# Patient Record
Sex: MALE | Race: WHITE | Employment: FULL TIME | URBAN - METROPOLITAN AREA
[De-identification: names, ages, dates, MRNs, and addresses within clinical notes are randomized per-mention and may not be internally consistent; named-entity substitution may affect disease eponyms.]

---

## 2018-04-27 ENCOUNTER — APPOINTMENT (OUTPATIENT)
Dept: GENERAL RADIOLOGY | Age: 58
End: 2018-04-27
Payer: COMMERCIAL

## 2018-04-27 ENCOUNTER — HOSPITAL ENCOUNTER (OUTPATIENT)
Age: 58
Setting detail: OBSERVATION
Discharge: HOME | End: 2018-04-28
Attending: EMERGENCY MEDICINE | Admitting: INTERNAL MEDICINE
Payer: COMMERCIAL

## 2018-04-27 DIAGNOSIS — R07.9 CHEST PAIN, UNSPECIFIED TYPE: Primary | ICD-10-CM

## 2018-04-27 LAB
ALBUMIN SERPL-MCNC: 4.3 G/DL (ref 3.5–5.2)
ALP BLD-CCNC: 70 U/L (ref 40–129)
ALT SERPL-CCNC: 19 U/L (ref 0–40)
ANION GAP SERPL CALCULATED.3IONS-SCNC: 11 MMOL/L (ref 7–16)
AST SERPL-CCNC: 18 U/L (ref 0–39)
BASOPHILS ABSOLUTE: 0.05 E9/L (ref 0–0.2)
BASOPHILS RELATIVE PERCENT: 0.8 % (ref 0–2)
BILIRUB SERPL-MCNC: 0.4 MG/DL (ref 0–1.2)
BUN BLDV-MCNC: 16 MG/DL (ref 6–20)
CALCIUM SERPL-MCNC: 9.5 MG/DL (ref 8.6–10.2)
CHLORIDE BLD-SCNC: 99 MMOL/L (ref 98–107)
CK MB: 2.8 NG/ML (ref 0–7.7)
CO2: 29 MMOL/L (ref 22–29)
CREAT SERPL-MCNC: 0.9 MG/DL (ref 0.7–1.2)
D DIMER: 217 NG/ML DDU
EOSINOPHILS ABSOLUTE: 0.12 E9/L (ref 0.05–0.5)
EOSINOPHILS RELATIVE PERCENT: 2 % (ref 0–6)
FOLATE: >20 NG/ML (ref 4.8–24.2)
GFR AFRICAN AMERICAN: >60
GFR NON-AFRICAN AMERICAN: >60 ML/MIN/1.73
GLUCOSE BLD-MCNC: 101 MG/DL (ref 74–109)
HBA1C MFR BLD: 5.6 % (ref 4.8–5.9)
HCT VFR BLD CALC: 38.9 % (ref 37–54)
HEMOGLOBIN: 13.1 G/DL (ref 12.5–16.5)
IMMATURE GRANULOCYTES #: 0.02 E9/L
IMMATURE GRANULOCYTES %: 0.3 % (ref 0–5)
LV EF: 55 %
LVEF MODALITY: NORMAL
LYMPHOCYTES ABSOLUTE: 0.79 E9/L (ref 1.5–4)
LYMPHOCYTES RELATIVE PERCENT: 13.4 % (ref 20–42)
MCH RBC QN AUTO: 32.3 PG (ref 26–35)
MCHC RBC AUTO-ENTMCNC: 33.7 % (ref 32–34.5)
MCV RBC AUTO: 96 FL (ref 80–99.9)
MONOCYTES ABSOLUTE: 0.56 E9/L (ref 0.1–0.95)
MONOCYTES RELATIVE PERCENT: 9.5 % (ref 2–12)
NEUTROPHILS ABSOLUTE: 4.36 E9/L (ref 1.8–7.3)
NEUTROPHILS RELATIVE PERCENT: 74 % (ref 43–80)
PDW BLD-RTO: 11.8 FL (ref 11.5–15)
PHOSPHORUS: 3.2 MG/DL (ref 2.5–4.5)
PLATELET # BLD: 380 E9/L (ref 130–450)
PMV BLD AUTO: 9.4 FL (ref 7–12)
POTASSIUM SERPL-SCNC: 4.1 MMOL/L (ref 3.5–5)
PRO-BNP: 31 PG/ML (ref 0–125)
RBC # BLD: 4.05 E12/L (ref 3.8–5.8)
SODIUM BLD-SCNC: 139 MMOL/L (ref 132–146)
T4 FREE: 1 NG/DL (ref 0.93–1.7)
TOTAL PROTEIN: 7.4 G/DL (ref 6.4–8.3)
TROPONIN: <0.01 NG/ML (ref 0–0.03)
VITAMIN B-12: 420 PG/ML (ref 211–946)
WBC # BLD: 5.9 E9/L (ref 4.5–11.5)

## 2018-04-27 PROCEDURE — 71045 X-RAY EXAM CHEST 1 VIEW: CPT

## 2018-04-27 PROCEDURE — 84439 ASSAY OF FREE THYROXINE: CPT

## 2018-04-27 PROCEDURE — 84484 ASSAY OF TROPONIN QUANT: CPT

## 2018-04-27 PROCEDURE — G0378 HOSPITAL OBSERVATION PER HR: HCPCS

## 2018-04-27 PROCEDURE — 85378 FIBRIN DEGRADE SEMIQUANT: CPT

## 2018-04-27 PROCEDURE — 82746 ASSAY OF FOLIC ACID SERUM: CPT

## 2018-04-27 PROCEDURE — 82607 VITAMIN B-12: CPT

## 2018-04-27 PROCEDURE — 83880 ASSAY OF NATRIURETIC PEPTIDE: CPT

## 2018-04-27 PROCEDURE — 87088 URINE BACTERIA CULTURE: CPT

## 2018-04-27 PROCEDURE — 85025 COMPLETE CBC W/AUTO DIFF WBC: CPT

## 2018-04-27 PROCEDURE — 93306 TTE W/DOPPLER COMPLETE: CPT

## 2018-04-27 PROCEDURE — 96372 THER/PROPH/DIAG INJ SC/IM: CPT

## 2018-04-27 PROCEDURE — 36415 COLL VENOUS BLD VENIPUNCTURE: CPT

## 2018-04-27 PROCEDURE — 80053 COMPREHEN METABOLIC PANEL: CPT

## 2018-04-27 PROCEDURE — 99284 EMERGENCY DEPT VISIT MOD MDM: CPT

## 2018-04-27 PROCEDURE — 6370000000 HC RX 637 (ALT 250 FOR IP): Performed by: STUDENT IN AN ORGANIZED HEALTH CARE EDUCATION/TRAINING PROGRAM

## 2018-04-27 PROCEDURE — 6370000000 HC RX 637 (ALT 250 FOR IP): Performed by: INTERNAL MEDICINE

## 2018-04-27 PROCEDURE — 84100 ASSAY OF PHOSPHORUS: CPT

## 2018-04-27 PROCEDURE — 2580000003 HC RX 258: Performed by: INTERNAL MEDICINE

## 2018-04-27 PROCEDURE — 6360000002 HC RX W HCPCS: Performed by: INTERNAL MEDICINE

## 2018-04-27 PROCEDURE — 93005 ELECTROCARDIOGRAM TRACING: CPT | Performed by: STUDENT IN AN ORGANIZED HEALTH CARE EDUCATION/TRAINING PROGRAM

## 2018-04-27 PROCEDURE — 82553 CREATINE MB FRACTION: CPT

## 2018-04-27 PROCEDURE — 83036 HEMOGLOBIN GLYCOSYLATED A1C: CPT

## 2018-04-27 RX ORDER — TAMSULOSIN HYDROCHLORIDE 0.4 MG/1
0.4 CAPSULE ORAL DAILY
Status: DISCONTINUED | OUTPATIENT
Start: 2018-04-27 | End: 2018-04-27

## 2018-04-27 RX ORDER — SODIUM CHLORIDE 9 MG/ML
INJECTION, SOLUTION INTRAVENOUS CONTINUOUS
Status: DISCONTINUED | OUTPATIENT
Start: 2018-04-27 | End: 2018-04-28 | Stop reason: HOSPADM

## 2018-04-27 RX ORDER — CITALOPRAM 20 MG/1
15 TABLET ORAL DAILY
Status: DISCONTINUED | OUTPATIENT
Start: 2018-04-27 | End: 2018-04-28 | Stop reason: HOSPADM

## 2018-04-27 RX ORDER — NAPROXEN 500 MG/1
500 TABLET ORAL 2 TIMES DAILY
Status: DISCONTINUED | OUTPATIENT
Start: 2018-04-27 | End: 2018-04-27

## 2018-04-27 RX ORDER — PANTOPRAZOLE SODIUM 40 MG/1
40 TABLET, DELAYED RELEASE ORAL
Status: DISCONTINUED | OUTPATIENT
Start: 2018-04-28 | End: 2018-04-28 | Stop reason: HOSPADM

## 2018-04-27 RX ORDER — ASPIRIN 81 MG/1
324 TABLET, CHEWABLE ORAL ONCE
Status: COMPLETED | OUTPATIENT
Start: 2018-04-27 | End: 2018-04-27

## 2018-04-27 RX ORDER — NITROGLYCERIN 0.4 MG/1
0.4 TABLET SUBLINGUAL EVERY 5 MIN PRN
Status: DISCONTINUED | OUTPATIENT
Start: 2018-04-27 | End: 2018-04-28 | Stop reason: HOSPADM

## 2018-04-27 RX ORDER — HYDROCODONE BITARTRATE AND ACETAMINOPHEN 10; 325 MG/1; MG/1
1 TABLET ORAL NIGHTLY
Status: DISCONTINUED | OUTPATIENT
Start: 2018-04-27 | End: 2018-04-28 | Stop reason: HOSPADM

## 2018-04-27 RX ORDER — METOPROLOL SUCCINATE 50 MG/1
50 TABLET, EXTENDED RELEASE ORAL DAILY
Status: DISCONTINUED | OUTPATIENT
Start: 2018-04-27 | End: 2018-04-28 | Stop reason: HOSPADM

## 2018-04-27 RX ORDER — ONDANSETRON 4 MG/1
4 TABLET, FILM COATED ORAL EVERY 8 HOURS PRN
Status: DISCONTINUED | OUTPATIENT
Start: 2018-04-27 | End: 2018-04-28 | Stop reason: HOSPADM

## 2018-04-27 RX ORDER — ASPIRIN 81 MG/1
81 TABLET, CHEWABLE ORAL DAILY
Status: DISCONTINUED | OUTPATIENT
Start: 2018-04-27 | End: 2018-04-28 | Stop reason: HOSPADM

## 2018-04-27 RX ORDER — HYDROCODONE BITARTRATE AND ACETAMINOPHEN 10; 325 MG/1; MG/1
1 TABLET ORAL NIGHTLY
COMMUNITY

## 2018-04-27 RX ORDER — ACETAMINOPHEN 325 MG/1
650 TABLET ORAL EVERY 4 HOURS PRN
Status: DISCONTINUED | OUTPATIENT
Start: 2018-04-27 | End: 2018-04-28 | Stop reason: HOSPADM

## 2018-04-27 RX ORDER — ATORVASTATIN CALCIUM 40 MG/1
40 TABLET, FILM COATED ORAL NIGHTLY
Status: DISCONTINUED | OUTPATIENT
Start: 2018-04-27 | End: 2018-04-28 | Stop reason: HOSPADM

## 2018-04-27 RX ADMIN — ASPIRIN 81 MG 324 MG: 81 TABLET ORAL at 09:07

## 2018-04-27 RX ADMIN — ACETAMINOPHEN 650 MG: 325 TABLET, FILM COATED ORAL at 16:47

## 2018-04-27 RX ADMIN — ENOXAPARIN SODIUM 40 MG: 40 INJECTION SUBCUTANEOUS at 12:58

## 2018-04-27 RX ADMIN — HYDROCODONE BITARTRATE AND ACETAMINOPHEN 1 TABLET: 10; 325 TABLET ORAL at 20:43

## 2018-04-27 RX ADMIN — CITALOPRAM 15 MG: 20 TABLET, FILM COATED ORAL at 14:25

## 2018-04-27 RX ADMIN — SODIUM CHLORIDE: 9 INJECTION, SOLUTION INTRAVENOUS at 11:58

## 2018-04-27 RX ADMIN — METOPROLOL SUCCINATE 50 MG: 50 TABLET, FILM COATED, EXTENDED RELEASE ORAL at 12:58

## 2018-04-27 ASSESSMENT — ENCOUNTER SYMPTOMS
WHEEZING: 0
SHORTNESS OF BREATH: 1
CHEST TIGHTNESS: 0
APNEA: 0
CHOKING: 0
COLOR CHANGE: 0
ABDOMINAL PAIN: 0
STRIDOR: 0
CONSTIPATION: 0
VOMITING: 0
BACK PAIN: 0
COUGH: 0
SORE THROAT: 0
DIARRHEA: 0
NAUSEA: 1

## 2018-04-27 ASSESSMENT — PAIN SCALES - GENERAL
PAINLEVEL_OUTOF10: 4
PAINLEVEL_OUTOF10: 0
PAINLEVEL_OUTOF10: 4
PAINLEVEL_OUTOF10: 5
PAINLEVEL_OUTOF10: 4

## 2018-04-27 ASSESSMENT — HEART SCORE: ECG: 0

## 2018-04-27 ASSESSMENT — PAIN DESCRIPTION - FREQUENCY: FREQUENCY: CONTINUOUS

## 2018-04-27 ASSESSMENT — PAIN DESCRIPTION - LOCATION
LOCATION: HEAD

## 2018-04-27 ASSESSMENT — PAIN DESCRIPTION - DESCRIPTORS: DESCRIPTORS: HEADACHE

## 2018-04-27 ASSESSMENT — PAIN DESCRIPTION - PAIN TYPE: TYPE: ACUTE PAIN

## 2018-04-28 ENCOUNTER — APPOINTMENT (OUTPATIENT)
Dept: NUCLEAR MEDICINE | Age: 58
End: 2018-04-28
Payer: COMMERCIAL

## 2018-04-28 VITALS
RESPIRATION RATE: 16 BRPM | TEMPERATURE: 97.7 F | SYSTOLIC BLOOD PRESSURE: 124 MMHG | WEIGHT: 167.2 LBS | DIASTOLIC BLOOD PRESSURE: 72 MMHG | HEIGHT: 69 IN | BODY MASS INDEX: 24.76 KG/M2 | OXYGEN SATURATION: 98 % | HEART RATE: 80 BPM

## 2018-04-28 PROBLEM — E78.5 LIPIDS SERUM INCREASED: Status: ACTIVE | Noted: 2018-04-28

## 2018-04-28 LAB
ALBUMIN SERPL-MCNC: 3.6 G/DL (ref 3.5–5.2)
ALP BLD-CCNC: 63 U/L (ref 40–129)
ALT SERPL-CCNC: 16 U/L (ref 0–40)
ANION GAP SERPL CALCULATED.3IONS-SCNC: 14 MMOL/L (ref 7–16)
AST SERPL-CCNC: 15 U/L (ref 0–39)
BASOPHILS ABSOLUTE: 0.05 E9/L (ref 0–0.2)
BASOPHILS RELATIVE PERCENT: 0.8 % (ref 0–2)
BILIRUB SERPL-MCNC: 0.4 MG/DL (ref 0–1.2)
BUN BLDV-MCNC: 14 MG/DL (ref 6–20)
CALCIUM SERPL-MCNC: 8.9 MG/DL (ref 8.6–10.2)
CHLORIDE BLD-SCNC: 101 MMOL/L (ref 98–107)
CHOLESTEROL, TOTAL: 230 MG/DL (ref 0–199)
CO2: 26 MMOL/L (ref 22–29)
CREAT SERPL-MCNC: 0.9 MG/DL (ref 0.7–1.2)
EOSINOPHILS ABSOLUTE: 0.23 E9/L (ref 0.05–0.5)
EOSINOPHILS RELATIVE PERCENT: 3.6 % (ref 0–6)
GFR AFRICAN AMERICAN: >60
GFR NON-AFRICAN AMERICAN: >60 ML/MIN/1.73
GLUCOSE BLD-MCNC: 89 MG/DL (ref 74–109)
HCT VFR BLD CALC: 39.1 % (ref 37–54)
HDLC SERPL-MCNC: 38 MG/DL
HEMOGLOBIN: 13.2 G/DL (ref 12.5–16.5)
IMMATURE GRANULOCYTES #: 0.03 E9/L
IMMATURE GRANULOCYTES %: 0.5 % (ref 0–5)
LDL CHOLESTEROL CALCULATED: 163 MG/DL (ref 0–99)
LV EF: 55 %
LVEF MODALITY: NORMAL
LYMPHOCYTES ABSOLUTE: 1.18 E9/L (ref 1.5–4)
LYMPHOCYTES RELATIVE PERCENT: 18.5 % (ref 20–42)
MAGNESIUM: 2.2 MG/DL (ref 1.6–2.6)
MCH RBC QN AUTO: 32.9 PG (ref 26–35)
MCHC RBC AUTO-ENTMCNC: 33.8 % (ref 32–34.5)
MCV RBC AUTO: 97.5 FL (ref 80–99.9)
MONOCYTES ABSOLUTE: 0.68 E9/L (ref 0.1–0.95)
MONOCYTES RELATIVE PERCENT: 10.7 % (ref 2–12)
NEUTROPHILS ABSOLUTE: 4.21 E9/L (ref 1.8–7.3)
NEUTROPHILS RELATIVE PERCENT: 65.9 % (ref 43–80)
PDW BLD-RTO: 12 FL (ref 11.5–15)
PLATELET # BLD: 379 E9/L (ref 130–450)
PMV BLD AUTO: 9.7 FL (ref 7–12)
POTASSIUM SERPL-SCNC: 4 MMOL/L (ref 3.5–5)
RBC # BLD: 4.01 E12/L (ref 3.8–5.8)
SODIUM BLD-SCNC: 141 MMOL/L (ref 132–146)
TOTAL PROTEIN: 6.9 G/DL (ref 6.4–8.3)
TRIGL SERPL-MCNC: 144 MG/DL (ref 0–149)
TSH SERPL DL<=0.05 MIU/L-ACNC: 1.56 UIU/ML (ref 0.27–4.2)
VLDLC SERPL CALC-MCNC: 29 MG/DL
WBC # BLD: 6.4 E9/L (ref 4.5–11.5)

## 2018-04-28 PROCEDURE — 84443 ASSAY THYROID STIM HORMONE: CPT

## 2018-04-28 PROCEDURE — 93017 CV STRESS TEST TRACING ONLY: CPT

## 2018-04-28 PROCEDURE — A9500 TC99M SESTAMIBI: HCPCS | Performed by: RADIOLOGY

## 2018-04-28 PROCEDURE — 78452 HT MUSCLE IMAGE SPECT MULT: CPT

## 2018-04-28 PROCEDURE — G0378 HOSPITAL OBSERVATION PER HR: HCPCS

## 2018-04-28 PROCEDURE — 85025 COMPLETE CBC W/AUTO DIFF WBC: CPT

## 2018-04-28 PROCEDURE — 6370000000 HC RX 637 (ALT 250 FOR IP): Performed by: INTERNAL MEDICINE

## 2018-04-28 PROCEDURE — 36415 COLL VENOUS BLD VENIPUNCTURE: CPT

## 2018-04-28 PROCEDURE — 80061 LIPID PANEL: CPT

## 2018-04-28 PROCEDURE — 80053 COMPREHEN METABOLIC PANEL: CPT

## 2018-04-28 PROCEDURE — 3430000000 HC RX DIAGNOSTIC RADIOPHARMACEUTICAL: Performed by: RADIOLOGY

## 2018-04-28 PROCEDURE — 83735 ASSAY OF MAGNESIUM: CPT

## 2018-04-28 RX ADMIN — PANTOPRAZOLE SODIUM 40 MG: 40 TABLET, DELAYED RELEASE ORAL at 06:25

## 2018-04-28 RX ADMIN — ASPIRIN 81 MG 81 MG: 81 TABLET ORAL at 08:50

## 2018-04-28 RX ADMIN — Medication 35 MILLICURIE: at 11:55

## 2018-04-28 RX ADMIN — Medication 11 MILLICURIE: at 09:10

## 2018-04-28 RX ADMIN — METOPROLOL SUCCINATE 50 MG: 50 TABLET, FILM COATED, EXTENDED RELEASE ORAL at 08:51

## 2018-04-28 RX ADMIN — CITALOPRAM 15 MG: 20 TABLET, FILM COATED ORAL at 08:50

## 2018-04-28 ASSESSMENT — PAIN SCALES - GENERAL
PAINLEVEL_OUTOF10: 0
PAINLEVEL_OUTOF10: 0

## 2018-04-29 LAB — URINE CULTURE, ROUTINE: NORMAL

## 2018-04-30 LAB
EKG ATRIAL RATE: 77 BPM
EKG P AXIS: 68 DEGREES
EKG P-R INTERVAL: 170 MS
EKG Q-T INTERVAL: 400 MS
EKG QRS DURATION: 80 MS
EKG QTC CALCULATION (BAZETT): 452 MS
EKG R AXIS: 48 DEGREES
EKG T AXIS: 39 DEGREES
EKG VENTRICULAR RATE: 77 BPM

## 2018-05-31 ENCOUNTER — HOSPITAL ENCOUNTER (OUTPATIENT)
Age: 58
Discharge: HOME | End: 2018-06-02
Payer: COMMERCIAL

## 2018-05-31 LAB
ALBUMIN SERPL-MCNC: 4.1 G/DL (ref 3.5–5.2)
ALP BLD-CCNC: 66 U/L (ref 40–129)
ALT SERPL-CCNC: 21 U/L (ref 0–40)
ANION GAP SERPL CALCULATED.3IONS-SCNC: 16 MMOL/L (ref 7–16)
AST SERPL-CCNC: 20 U/L (ref 0–39)
BASOPHILS ABSOLUTE: 0.06 E9/L (ref 0–0.2)
BASOPHILS RELATIVE PERCENT: 1 % (ref 0–2)
BILIRUB SERPL-MCNC: <0.2 MG/DL (ref 0–1.2)
BUN BLDV-MCNC: 14 MG/DL (ref 6–20)
CALCIUM SERPL-MCNC: 9.2 MG/DL (ref 8.6–10.2)
CHLORIDE BLD-SCNC: 104 MMOL/L (ref 98–107)
CHOLESTEROL, TOTAL: 207 MG/DL (ref 0–199)
CO2: 24 MMOL/L (ref 22–29)
CREAT SERPL-MCNC: 0.9 MG/DL (ref 0.7–1.2)
EOSINOPHILS ABSOLUTE: 0.13 E9/L (ref 0.05–0.5)
EOSINOPHILS RELATIVE PERCENT: 2.2 % (ref 0–6)
GFR AFRICAN AMERICAN: >60
GFR NON-AFRICAN AMERICAN: >60 ML/MIN/1.73
GLUCOSE BLD-MCNC: 85 MG/DL (ref 74–109)
HCT VFR BLD CALC: 39.7 % (ref 37–54)
HDLC SERPL-MCNC: 42 MG/DL
HEMOGLOBIN: 13 G/DL (ref 12.5–16.5)
IMMATURE GRANULOCYTES #: 0.02 E9/L
IMMATURE GRANULOCYTES %: 0.3 % (ref 0–5)
LDL CHOLESTEROL CALCULATED: 136 MG/DL (ref 0–99)
LYMPHOCYTES ABSOLUTE: 0.9 E9/L (ref 1.5–4)
LYMPHOCYTES RELATIVE PERCENT: 15.3 % (ref 20–42)
MCH RBC QN AUTO: 32.9 PG (ref 26–35)
MCHC RBC AUTO-ENTMCNC: 32.7 % (ref 32–34.5)
MCV RBC AUTO: 100.5 FL (ref 80–99.9)
MONOCYTES ABSOLUTE: 0.59 E9/L (ref 0.1–0.95)
MONOCYTES RELATIVE PERCENT: 10 % (ref 2–12)
NEUTROPHILS ABSOLUTE: 4.19 E9/L (ref 1.8–7.3)
NEUTROPHILS RELATIVE PERCENT: 71.2 % (ref 43–80)
PDW BLD-RTO: 12.1 FL (ref 11.5–15)
PLATELET # BLD: 377 E9/L (ref 130–450)
PMV BLD AUTO: 10 FL (ref 7–12)
POTASSIUM SERPL-SCNC: 4.4 MMOL/L (ref 3.5–5)
PROSTATE SPECIFIC ANTIGEN: 1.7 NG/ML (ref 0–4)
RBC # BLD: 3.95 E12/L (ref 3.8–5.8)
SODIUM BLD-SCNC: 144 MMOL/L (ref 132–146)
TOTAL PROTEIN: 7.2 G/DL (ref 6.4–8.3)
TRIGL SERPL-MCNC: 144 MG/DL (ref 0–149)
TSH SERPL DL<=0.05 MIU/L-ACNC: 1.32 UIU/ML (ref 0.27–4.2)
VITAMIN D 25-HYDROXY: 44 NG/ML (ref 30–100)
VLDLC SERPL CALC-MCNC: 29 MG/DL
WBC # BLD: 5.9 E9/L (ref 4.5–11.5)

## 2018-05-31 PROCEDURE — 80061 LIPID PANEL: CPT

## 2018-05-31 PROCEDURE — 82306 VITAMIN D 25 HYDROXY: CPT

## 2018-05-31 PROCEDURE — 85025 COMPLETE CBC W/AUTO DIFF WBC: CPT

## 2018-05-31 PROCEDURE — 84443 ASSAY THYROID STIM HORMONE: CPT

## 2018-05-31 PROCEDURE — 80053 COMPREHEN METABOLIC PANEL: CPT

## 2018-05-31 PROCEDURE — G0103 PSA SCREENING: HCPCS

## 2018-09-05 ENCOUNTER — HOSPITAL ENCOUNTER (OUTPATIENT)
Age: 58
Discharge: HOME | End: 2018-09-07
Payer: COMMERCIAL

## 2018-09-05 LAB
ALBUMIN SERPL-MCNC: 4.1 G/DL (ref 3.5–5.2)
ALP BLD-CCNC: 66 U/L (ref 40–129)
ALT SERPL-CCNC: 29 U/L (ref 0–40)
AST SERPL-CCNC: 28 U/L (ref 0–39)
BILIRUB SERPL-MCNC: 0.4 MG/DL (ref 0–1.2)
BILIRUBIN DIRECT: <0.2 MG/DL (ref 0–0.3)
BILIRUBIN, INDIRECT: NORMAL MG/DL (ref 0–1)
CHOLESTEROL, TOTAL: 194 MG/DL (ref 0–199)
HDLC SERPL-MCNC: 47 MG/DL
LDL CHOLESTEROL CALCULATED: 124 MG/DL (ref 0–99)
TOTAL PROTEIN: 7.3 G/DL (ref 6.4–8.3)
TRIGL SERPL-MCNC: 114 MG/DL (ref 0–149)
VLDLC SERPL CALC-MCNC: 23 MG/DL

## 2018-09-05 PROCEDURE — 80061 LIPID PANEL: CPT

## 2018-09-05 PROCEDURE — 80076 HEPATIC FUNCTION PANEL: CPT

## 2019-03-20 ENCOUNTER — HOSPITAL ENCOUNTER (OUTPATIENT)
Age: 59
Discharge: HOME | End: 2019-03-22
Payer: COMMERCIAL

## 2019-03-20 LAB
ALBUMIN SERPL-MCNC: 4.1 G/DL (ref 3.5–5.2)
ALP BLD-CCNC: 66 U/L (ref 40–129)
ALT SERPL-CCNC: 24 U/L (ref 0–40)
ANION GAP SERPL CALCULATED.3IONS-SCNC: 11 MMOL/L (ref 7–16)
AST SERPL-CCNC: 19 U/L (ref 0–39)
BASOPHILS ABSOLUTE: 0.03 E9/L (ref 0–0.2)
BASOPHILS RELATIVE PERCENT: 0.3 % (ref 0–2)
BILIRUB SERPL-MCNC: <0.2 MG/DL (ref 0–1.2)
BUN BLDV-MCNC: 15 MG/DL (ref 6–20)
CALCIUM SERPL-MCNC: 8.9 MG/DL (ref 8.6–10.2)
CHLORIDE BLD-SCNC: 108 MMOL/L (ref 98–107)
CHOLESTEROL, TOTAL: 137 MG/DL (ref 0–199)
CO2: 28 MMOL/L (ref 22–29)
CREAT SERPL-MCNC: 0.9 MG/DL (ref 0.7–1.2)
EOSINOPHILS ABSOLUTE: 0.03 E9/L (ref 0.05–0.5)
EOSINOPHILS RELATIVE PERCENT: 0.3 % (ref 0–6)
GFR AFRICAN AMERICAN: >60
GFR NON-AFRICAN AMERICAN: >60 ML/MIN/1.73
GLUCOSE BLD-MCNC: 97 MG/DL (ref 74–99)
HCT VFR BLD CALC: 39.3 % (ref 37–54)
HDLC SERPL-MCNC: 47 MG/DL
HEMOGLOBIN: 12.5 G/DL (ref 12.5–16.5)
IMMATURE GRANULOCYTES #: 0.07 E9/L
IMMATURE GRANULOCYTES %: 0.7 % (ref 0–5)
LDL CHOLESTEROL CALCULATED: 79 MG/DL (ref 0–99)
LYMPHOCYTES ABSOLUTE: 1.65 E9/L (ref 1.5–4)
LYMPHOCYTES RELATIVE PERCENT: 16.8 % (ref 20–42)
MCH RBC QN AUTO: 32.6 PG (ref 26–35)
MCHC RBC AUTO-ENTMCNC: 31.8 % (ref 32–34.5)
MCV RBC AUTO: 102.3 FL (ref 80–99.9)
MONOCYTES ABSOLUTE: 1.1 E9/L (ref 0.1–0.95)
MONOCYTES RELATIVE PERCENT: 11.2 % (ref 2–12)
NEUTROPHILS ABSOLUTE: 6.93 E9/L (ref 1.8–7.3)
NEUTROPHILS RELATIVE PERCENT: 70.7 % (ref 43–80)
PDW BLD-RTO: 11.9 FL (ref 11.5–15)
PLATELET # BLD: 393 E9/L (ref 130–450)
PMV BLD AUTO: 10.3 FL (ref 7–12)
POTASSIUM SERPL-SCNC: 4.1 MMOL/L (ref 3.5–5)
PROSTATE SPECIFIC ANTIGEN: 1.18 NG/ML (ref 0–4)
RBC # BLD: 3.84 E12/L (ref 3.8–5.8)
SODIUM BLD-SCNC: 147 MMOL/L (ref 132–146)
TOTAL PROTEIN: 6.7 G/DL (ref 6.4–8.3)
TRIGL SERPL-MCNC: 57 MG/DL (ref 0–149)
TSH SERPL DL<=0.05 MIU/L-ACNC: 0.92 UIU/ML (ref 0.27–4.2)
VITAMIN D 25-HYDROXY: 47 NG/ML (ref 30–100)
VLDLC SERPL CALC-MCNC: 11 MG/DL
WBC # BLD: 9.8 E9/L (ref 4.5–11.5)

## 2019-03-20 PROCEDURE — 85025 COMPLETE CBC W/AUTO DIFF WBC: CPT

## 2019-03-20 PROCEDURE — G0103 PSA SCREENING: HCPCS

## 2019-03-20 PROCEDURE — 82306 VITAMIN D 25 HYDROXY: CPT

## 2019-03-20 PROCEDURE — 84443 ASSAY THYROID STIM HORMONE: CPT

## 2019-03-20 PROCEDURE — 80061 LIPID PANEL: CPT

## 2019-03-20 PROCEDURE — 80053 COMPREHEN METABOLIC PANEL: CPT

## 2020-05-05 ENCOUNTER — APPOINTMENT (OUTPATIENT)
Dept: GENERAL RADIOLOGY | Age: 60
End: 2020-05-05
Payer: COMMERCIAL

## 2020-05-05 ENCOUNTER — HOSPITAL ENCOUNTER (EMERGENCY)
Age: 60
Discharge: HOME | End: 2020-05-05
Attending: EMERGENCY MEDICINE
Payer: COMMERCIAL

## 2020-05-05 VITALS
TEMPERATURE: 98 F | OXYGEN SATURATION: 100 % | DIASTOLIC BLOOD PRESSURE: 74 MMHG | HEART RATE: 87 BPM | RESPIRATION RATE: 16 BRPM | WEIGHT: 175 LBS | BODY MASS INDEX: 25.92 KG/M2 | HEIGHT: 69 IN | SYSTOLIC BLOOD PRESSURE: 151 MMHG

## 2020-05-05 PROCEDURE — 73030 X-RAY EXAM OF SHOULDER: CPT

## 2020-05-05 PROCEDURE — 99283 EMERGENCY DEPT VISIT LOW MDM: CPT

## 2020-05-05 ASSESSMENT — PAIN DESCRIPTION - FREQUENCY: FREQUENCY: CONTINUOUS

## 2020-05-05 ASSESSMENT — PAIN DESCRIPTION - PAIN TYPE: TYPE: ACUTE PAIN

## 2020-05-05 ASSESSMENT — PAIN SCALES - GENERAL: PAINLEVEL_OUTOF10: 9

## 2020-05-05 ASSESSMENT — PAIN DESCRIPTION - DESCRIPTORS: DESCRIPTORS: SORE

## 2020-05-05 ASSESSMENT — PAIN DESCRIPTION - LOCATION: LOCATION: SHOULDER

## 2020-05-05 ASSESSMENT — PAIN DESCRIPTION - ORIENTATION: ORIENTATION: RIGHT

## 2020-05-05 NOTE — ED PROVIDER NOTES
HPI:  5/5/20, Time: 4:50 PM EDT         Torres Milian is a 61 y.o. male presenting to the ED for right shoulder pain s/p falling a table at work (about 2 feet high) landing on right shoulder. Did hit head but denies LOC or neck or back pain. States takes Norco for chronic back pain. Denies numbness or tingling or other injury or complaint at this time. .  The complaint has been persistent, moderate in severity, and worsened by movement. Patient denies fever/chills, cough, congestion, chest pain, shortness of breath, edema, headache, visual disturbances, focal paresthesias, focal weakness, abdominal pain, nausea, vomiting, diarrhea, constipation, dysuria, hematuria, neck or back pain or other complaints. ROS:   Pertinent positives and negatives are stated within HPI, all other systems reviewed and are negative.      --------------------------------------------- PAST HISTORY ---------------------------------------------  Past Medical History:  has a past medical history of Anxiety, Kidney stone, Lipids serum increased, and Sarcoma ([facility] [address] Past Surgical History:  has a past surgical history that includes Arm Surgery (Right, 1996) and Colonoscopy. Social History:  reports that he has never smoked. He has never used smokeless tobacco. He reports that he does not drink alcohol or use drugs. Family History: family history includes Coronary Art Dis in his father; Lupus in his mother. The patients home medications have been reviewed. Allergies: Patient has no known allergies.         ---------------------------------------------------PHYSICAL EXAM--------------------------------------    Constitutional:  Well developed, well nourished, no acute distress, non-toxic appearance   Eyes:  PERRL, conjunctiva normal, EOMI  HENT:  Atraumatic, external ears normal, nose normal, oropharynx moist. Neck- normal range of motion, no tenderness, supple, no nuchal rigidity   Respiratory:  No respiratory distress,

## 2020-05-06 ENCOUNTER — CARE COORDINATION (OUTPATIENT)
Dept: CARE COORDINATION | Age: 60
End: 2020-05-06

## 2020-05-06 NOTE — CARE COORDINATION
Patient contacted regarding recent visit for viral symptoms. [facility] contacted the patient by telephone to perform post discharge call. Verified name and  with patient as identifiers. Provided introduction to self, and reason for call due to viral symptoms of infection and/or exposure to COVID-19. Patient presented to emergency department/flu clinic with complaints of Traumatic closed fracture of distal clavicle with minimal displacement, right, initial encounter . Patient reports symptoms are the same. Due to no new or worsening symptoms the RN CTN/MIKI was not notified for escalation. Discussed exposure protocols and quarantine with CDC Guidelines What To Do If You Are Sick    Patient was given an opportunity for questions and concerns. Stay home except to get medical care    Separate yourself from other people and animals in your home    Call ahead before visiting your doctor    Wear a facemask    Cover your coughs and sneezes    Clean your hands often    Avoid sharing personal household items    Clean all high-touch surfaces everyday    Monitor your symptoms  Seek prompt medical attention if your illness is worsening (e.g., difficulty breathing). Before seeking care, call your healthcare provider and tell them that you have, or are being evaluated for, COVID-19. Put on a facemask before you enter the facility. These steps will help the healthcare provider's office to keep other people in the office or waiting room from getting infected or exposed. Ask your healthcare provider to call the local or [facility] health department. Persons who are placed under If you have a medical emergency and need to call 911, notify the dispatch personnel that you have, or are being evaluated for COVID-19. If possible, put on a facemask before emergency medical services arrive.     The patient agrees to contact the [facility] exposure line [phone], local health department [place] Department of Health:

## 2020-05-10 ENCOUNTER — CARE COORDINATION (OUTPATIENT)
Dept: CARE COORDINATION | Age: 60
End: 2020-05-10

## 2020-05-11 ENCOUNTER — OFFICE VISIT (OUTPATIENT)
Dept: ORTHOPEDIC SURGERY | Age: 60
End: 2020-05-11
Payer: COMMERCIAL

## 2020-05-11 VITALS — WEIGHT: 175 LBS | BODY MASS INDEX: 25.92 KG/M2 | TEMPERATURE: 96.8 F | HEIGHT: 69 IN

## 2020-05-11 PROCEDURE — 99203 OFFICE O/P NEW LOW 30 MIN: CPT | Performed by: ORTHOPAEDIC SURGERY

## 2020-05-11 PROCEDURE — 23500 CLTX CLAVICULAR FX W/O MNPJ: CPT | Performed by: ORTHOPAEDIC SURGERY

## 2020-05-11 RX ORDER — MELOXICAM 15 MG/1
TABLET ORAL
COMMUNITY
Start: 2020-02-17

## 2020-05-11 RX ORDER — ATORVASTATIN CALCIUM 40 MG/1
TABLET, FILM COATED ORAL
COMMUNITY
Start: 2020-04-12

## 2020-05-11 RX ORDER — BACLOFEN 10 MG/1
TABLET ORAL
COMMUNITY
Start: 2020-03-19

## 2020-05-11 RX ORDER — CITALOPRAM 40 MG/1
TABLET ORAL
COMMUNITY
Start: 2020-03-09

## 2020-05-11 NOTE — PROGRESS NOTES
New Shoulder Patient Visit     Referring Provider:   No referring provider defined for this encounter. CHIEF COMPLAINT:   Chief Complaint   Patient presents with    Work Related Injury     2020    Shoulder Pain     Rt shoulder / clavicle pain, he states that he missed a step / slipped and jammed the shoulder    Results     Rt shoulder in [facility]    ED Follow-up     20: Traumatic closed fracture of distal clavicle with minimal displacement        HPI:      Amber Best is a 61y.o. year old male who is seen today  for evaluation of right shoulder pain. About 6 days ago he suffered a fall where he landed directly onto the right shoulder while he was at work. He had pain afterwards and underwent an x-ray at the emergency room that showed a nondisplaced distal clavicle fracture. He was given a sling and follows up here today. He is working. He is a . 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; Adriamycin       PAST SURGICAL HISTORY  Past Surgical History:   Procedure Laterality Date    ARM SURGERY Right 1996    sarcoma right forearm    COLONOSCOPY      diverticulosis       FAMILY HISTORY   Family History   Problem Relation Age of Onset    Lupus Mother          age 61    Coronary Art Dis Father          age 80       SOCIAL HISTORY  Social History     Occupational History    Not on file   Tobacco Use    Smoking status: Never Smoker    Smokeless tobacco: Never Used   Substance and Sexual Activity    Alcohol use: No    Drug use: No    Sexual activity: Not on file       CURRENT MEDICATIONS     Current Outpatient Medications:     meloxicam (MOBIC) 15 MG tablet, , Disp: , Rfl:     atorvastatin (LIPITOR) 40 MG tablet, take 1 tablet by mouth once daily, Disp: , Rfl:     citalopram (CELEXA) 40 MG tablet, , Disp: , Rfl:

## 2020-06-22 ENCOUNTER — OFFICE VISIT (OUTPATIENT)
Dept: ORTHOPEDIC SURGERY | Age: 60
End: 2020-06-22

## 2020-06-22 VITALS — WEIGHT: 170 LBS | BODY MASS INDEX: 25.18 KG/M2 | TEMPERATURE: 96.8 F | HEIGHT: 69 IN

## 2020-06-22 PROCEDURE — 99024 POSTOP FOLLOW-UP VISIT: CPT | Performed by: ORTHOPAEDIC SURGERY

## 2020-08-10 ENCOUNTER — OFFICE VISIT (OUTPATIENT)
Dept: ORTHOPEDIC SURGERY | Age: 60
End: 2020-08-10
Payer: COMMERCIAL

## 2020-08-10 VITALS — TEMPERATURE: 97.6 F

## 2020-08-10 PROCEDURE — 99213 OFFICE O/P EST LOW 20 MIN: CPT | Performed by: ORTHOPAEDIC SURGERY

## 2020-08-10 NOTE — PROGRESS NOTES
Follow Up Visit     Tico Mead returns today for follow up visit regarding right distal clavicle fracture, stable from 5/11/20. Treatment thus far has included included conservative treatment: He was in a sling initially and has weaned himself in the sling. He reports some occasional pain when lifting, states it is keeping him up most nights. He reports symptoms are unchanged. Physical Exam:     No data recorded    General: Alert and oriented x3, no acute distress  Cardiovascular/pulmonary: No labored breathing, peripheral perfusion intact  Musculoskeletal:    On exam, he displays essentially full range of motion today. He has good strength with Nancy test.  He has some mild pain with cross body adduction deeper in his shoulder. He has no tenderness over the clavicle fracture today. Controlled Substances Monitoring:      Imaging:  X-rays of the right shoulder including 2 views of the clavicle show interval healing of his distal clavicle fracture, unchanged alignment    Impression: Interval healing with unchanged alignment of distal clavicle fracture on the right      Assessment: He is almost 3 months out from right distal clavicle fracture, doing well      Plan:   He will continue with normal activities and progress strengthening. We will see him back in 3 months. If he has not had improvement in his pain and function prior to that time we will consider getting an MRI prior to his follow-up to rule out concomitant rotator cuff pathology.   He is in agreement with this plan    Trey Eagle MD  Orthopaedic Surgery   8/10/20  2:21 PM

## 2020-11-03 ENCOUNTER — TELEPHONE (OUTPATIENT)
Dept: ORTHOPEDIC SURGERY | Age: 60
End: 2020-11-03

## 2020-11-03 NOTE — TELEPHONE ENCOUNTER
Patient is scheduled for f/u appointment on 11/11/2020, he is stating clavicle area is feeling fine but his shoulder is bothersome especially we sudden movements. It was discussed at last appointment on 8/10/2020, if shoulder was bothersome would proceed with obtaining MRI. Expressed we could order the MRI and postpone his 11/11 appointment until imaging is obtained or keep the 11/11 appointment, be evaluated again and if Dr Iris Grajeda felt needed MRI, then would proceed to place the order. Patient wishes to keep 11/11/20 appointment.       BRO

## 2020-11-11 ENCOUNTER — OFFICE VISIT (OUTPATIENT)
Dept: ORTHOPEDIC SURGERY | Age: 60
End: 2020-11-11
Payer: COMMERCIAL

## 2020-11-11 VITALS — WEIGHT: 179 LBS | HEIGHT: 69 IN | TEMPERATURE: 97.3 F | BODY MASS INDEX: 26.51 KG/M2

## 2020-11-11 PROCEDURE — 99213 OFFICE O/P EST LOW 20 MIN: CPT | Performed by: ORTHOPAEDIC SURGERY

## 2020-11-11 RX ORDER — TAMSULOSIN HYDROCHLORIDE 0.4 MG/1
CAPSULE ORAL
COMMUNITY
Start: 2020-11-04

## 2020-11-11 NOTE — PROGRESS NOTES
Follow Up Visit     Rachel Hanley returns today for follow up visit regarding right clavicle fracture. Treatment thus far has included nonoperative management. He reports symptoms are unchanged. Physical Exam:     Height: 5' 9\" (1.753 m), Weight: 179 lb (81.2 kg)    General: Alert and oriented x3, no acute distress  Cardiovascular/pulmonary: No labored breathing, peripheral perfusion intact  Musculoskeletal:    Right shoulder exam range of motion 170/50/iliac crest.  No tenderness over clavicle. No swelling or deformity visualized. Jobes, speeds, [place]'s exams elicit pain without weakness. Belly press exam is positive for stiffness. Controlled Substances Monitoring:      Imagin view x-ray right shoulder shows healed clavicle fracture    Impression: Healed clavicle fracture      Assessment: Right shoulder pain      Plan: Today we discussed his right shoulder. Patient has ongoing persistent pain that has not improved with exercises. Imaging taken today showed healed clavicle fracture. We discussed obtaining an MRI to further evaluate his shoulder. Patient is in agreement. MRI was ordered today. Patient will call the office to schedule follow-up appointment after MRI is obtained. Jacey Guillen CNP  Orthopaedic Surgery   20  1:15 PM    Staff Addendum    I have seen and evaluated the patient and agree with the assessment and plan as documented by Jacey Guillen CNP. I have performed the key components of the history and physical examination and concur with the findings and plan, and have made changes where appropriate/necessary. Agree with above. Shoulder exam today is most significant for possible subscapularis pathology namely tear. Will obtain MRI to assess.   Follow-up after MRI      Shayna Lacey MD  [address] [place] [address]

## 2020-11-23 ENCOUNTER — HOSPITAL ENCOUNTER (OUTPATIENT)
Dept: MRI IMAGING | Age: 60
Discharge: HOME | End: 2020-11-25
Payer: COMMERCIAL

## 2020-11-23 PROCEDURE — 73221 MRI JOINT UPR EXTREM W/O DYE: CPT

## 2020-12-09 ENCOUNTER — OFFICE VISIT (OUTPATIENT)
Dept: ORTHOPEDIC SURGERY | Age: 60
End: 2020-12-09
Payer: COMMERCIAL

## 2020-12-09 VITALS — TEMPERATURE: 97.3 F | BODY MASS INDEX: 26.51 KG/M2 | HEIGHT: 69 IN | WEIGHT: 179 LBS

## 2020-12-09 PROCEDURE — 99213 OFFICE O/P EST LOW 20 MIN: CPT | Performed by: ORTHOPAEDIC SURGERY

## 2020-12-09 NOTE — PROGRESS NOTES
Follow Up Visit     Bettina Barroso returns today for follow up visit regarding his right shoulder. He is here to review MRI. He reports persistent pain mainly along the posterior shoulder. Physical Exam:     Height: 5' 9\" (1.753 m), Weight: 179 lb (81.2 kg)    General: Alert and oriented x3, no acute distress  Cardiovascular/pulmonary: No labored breathing, peripheral perfusion intact  Musculoskeletal:    Exam of the right shoulder shows no tenderness over the clavicle. He has near full range of motion. He has some pain reaching behind the back and with liftoff test.  Belly press test is intact with mild limits at extremes. There is some anterior shoulder tenderness    Controlled Substances Monitoring:      Imaging:  MRI was reviewed showing thinning of the subscapularis possible partial tear of the upper portion. Biceps appears flattened as it exits the groove. Clavicle is healed. Supraspinatus appears normal      Assessment: Persistent right shoulder pain, history of clavicle fracture which is now healed, questionable partial subscapularis tear      Plan:   We discussed his shoulder today. He would like to continue conservative treatment. He will do home exercises.   We will see him back in 6 weeks to reassess    Ho Marin MD  Orthopaedic Surgery   12/9/20  2:53 PM

## 2021-01-20 ENCOUNTER — OFFICE VISIT (OUTPATIENT)
Dept: ORTHOPEDIC SURGERY | Age: 61
End: 2021-01-20
Payer: COMMERCIAL

## 2021-01-20 VITALS — TEMPERATURE: 97.2 F | HEIGHT: 69 IN | BODY MASS INDEX: 26.51 KG/M2 | WEIGHT: 179 LBS

## 2021-01-20 DIAGNOSIS — S42.034A: Primary | ICD-10-CM

## 2021-01-20 PROCEDURE — 99213 OFFICE O/P EST LOW 20 MIN: CPT | Performed by: ORTHOPAEDIC SURGERY

## 2021-01-20 NOTE — PROGRESS NOTES
Follow Up Visit     Sharon Snow returns today for follow up visit regarding persistent right shoulder pain, history of clavicle fracture which is now healed, questionable partial subscapularis tear. Treatment thus far has included home exercises, with minimal improvement. He reports symptoms are unchanged. Physical Exam:     Height: 5' 9\" (1.753 m), Weight: 179 lb (81.2 kg)    General: Alert and oriented x3, no acute distress  Cardiovascular/pulmonary: No labored breathing, peripheral perfusion intact  Musculoskeletal:    Right shoulder exam range of motion 170/50/T10. Pain present when reaching behind his back. Jobes and Speed's exam are positive for pain and mild weakness. [place]'s exam was positive for pain. Press exam was intact. There is tenderness over the biceps tendon        Imaging:  No new imaging obtained today. Previous imaging was reviewed. MRI showed probable partial-thickness tear of the subscapularis, there is flattening of the biceps tendon. Assessment: Right shoulder probable partial rotator cuff tear      Plan: Today we discussed his right shoulder. Patient has stated that he feels he has plateaued with his home exercises. He has adequate range of motion however pain persists. On rotator cuff strengthening he does have some mild weakness. We discussed continuing conservative treatment at this time. We will work on approval for a cortisone injection. Patient will follow-up after Worker's [employer]. approves injection. Germaine Rubin [address] [facility]  Orthopedic Surgery   01/20/21  3:43 PM    Staff Addendum    I have seen and evaluated the patient and agree with the assessment and plan as documented by Germaine Rubin CNP. I have performed the key components of the history and physical examination and concur with the findings and plan, and have made changes where appropriate/necessary. Agree with above. MRI again reviewed today, possibly some partial tearing of subscapularis some abnormality biceps. Exam today shows mainly tenderness over the anterior shoulder over the biceps tendon and subscapularis. Given the absence of any full-thickness tears, we discussed trial of a steroid injection. He has never had one in the past.  He was agreeable. Once this is approved through [facility] we will see him back and perform a steroid injection. We also discussed potential surgical management if he continues to have issues afterwards.       Natasha Dunn MD  [address] [place] [address]

## 2023-03-13 ENCOUNTER — TELEPHONE (OUTPATIENT)
Dept: CARDIOLOGY | Age: 63
End: 2023-03-13

## 2023-03-20 ENCOUNTER — TELEPHONE (OUTPATIENT)
Dept: CARDIOLOGY | Age: 63
End: 2023-03-20

## 2023-03-20 NOTE — TELEPHONE ENCOUNTER
Spoke with patient's wife and confirmed nuclear stress test appointment on March 22, 2023 at 0800. Instructions for test,medications reviewed, and COVID-19 preprocedure information reviewed with patient's wife, questions answered. Patient's wife verbalized understanding.

## 2023-03-22 ENCOUNTER — HOSPITAL ENCOUNTER (OUTPATIENT)
Dept: CARDIOLOGY | Age: 63
Discharge: HOME | End: 2023-03-22
Payer: COMMERCIAL

## 2023-03-22 VITALS
RESPIRATION RATE: 16 BRPM | SYSTOLIC BLOOD PRESSURE: 146 MMHG | DIASTOLIC BLOOD PRESSURE: 72 MMHG | BODY MASS INDEX: 26.51 KG/M2 | HEART RATE: 86 BPM | HEIGHT: 69 IN | WEIGHT: 179 LBS

## 2023-03-22 DIAGNOSIS — R06.09 DOE (DYSPNEA ON EXERTION): Primary | ICD-10-CM

## 2023-03-22 LAB
LV EF: 65 %
LVEF MODALITY: NORMAL

## 2023-03-22 PROCEDURE — 3430000000 HC RX DIAGNOSTIC RADIOPHARMACEUTICAL: Performed by: INTERNAL MEDICINE

## 2023-03-22 PROCEDURE — 2580000003 HC RX 258: Performed by: INTERNAL MEDICINE

## 2023-03-22 PROCEDURE — 93017 CV STRESS TEST TRACING ONLY: CPT

## 2023-03-22 PROCEDURE — A9500 TC99M SESTAMIBI: HCPCS | Performed by: INTERNAL MEDICINE

## 2023-03-22 PROCEDURE — 78452 HT MUSCLE IMAGE SPECT MULT: CPT

## 2023-03-22 RX ORDER — TETRAKIS(2-METHOXYISOBUTYLISOCYANIDE)COPPER(I) TETRAFLUOROBORATE 1 MG/ML
29.7 INJECTION, POWDER, LYOPHILIZED, FOR SOLUTION INTRAVENOUS
Status: COMPLETED | OUTPATIENT
Start: 2023-03-22 | End: 2023-03-22

## 2023-03-22 RX ORDER — TETRAKIS(2-METHOXYISOBUTYLISOCYANIDE)COPPER(I) TETRAFLUOROBORATE 1 MG/ML
10.2 INJECTION, POWDER, LYOPHILIZED, FOR SOLUTION INTRAVENOUS
Status: COMPLETED | OUTPATIENT
Start: 2023-03-22 | End: 2023-03-22

## 2023-03-22 RX ORDER — SODIUM CHLORIDE 0.9 % (FLUSH) 0.9 %
10 SYRINGE (ML) INJECTION PRN
Status: DISCONTINUED | OUTPATIENT
Start: 2023-03-22 | End: 2023-03-23 | Stop reason: HOSPADM

## 2023-03-22 RX ADMIN — SODIUM CHLORIDE, PRESERVATIVE FREE 10 ML: 5 INJECTION INTRAVENOUS at 10:15

## 2023-03-22 RX ADMIN — SODIUM CHLORIDE, PRESERVATIVE FREE 10 ML: 5 INJECTION INTRAVENOUS at 07:59

## 2023-03-22 RX ADMIN — Medication 10.2 MILLICURIE: at 07:59

## 2023-03-22 RX ADMIN — Medication 29.7 MILLICURIE: at 10:15

## 2023-03-22 NOTE — PROCEDURES
[address] [facility] [address] [phone]                Exercise Stress Nuclear Gated SPECT Study    Name: Faiza Yoder Account Number: [de-identified]    :  1960      Sex: male              Date of Study:  3/22/2023    Height: 5' 9\" (175.3 cm)  Weight: 179 lb (81.2 kg)     Ordering Provider: Ana Cantrell DO          PCP: Ana Cantrell DO      Cardiologist: none                        Interpreting Physician: Nicolasa Gowers, MD  _________________________________________________________________________________    Indication:   Detecting the presence and location of coronary artery disease    Clinical History:   Patient has no known history of coronary artery disease. Resting ECG:    HR 86 bpm  Normal sinus rhythm and Nonspecific ST-T wave changes    Exercise: The patient exercised using a Damian protocol, completing 6:00 minutes and reaching an estimated work load of 9.79 metabolic equivalents (METS). Resting HR was 86. Peak exercise heart rate was 142 ( 90% of maximum predicted heart rate for age). Baseline /72. Peak exercise /40. The blood pressure response to exercise was normal      Exercise was terminated due to dyspnea and heart rate attained. The patient experienced no chest pain with exercise. Pulse oximetry was used to monitor oxygen saturation during the stress test.  The study was performed on Room Air. The resting pulse oximeter was 99%. The lowest O2 saturation seen during exercise was 96 %. The average O2 saturation with exercise was 97.5 %. Exercise ECG:   The patient demonstrated no arrhythmias during exercise. With exercise, there were no ST segment changes of significance at the heart rate achieved. Morrell treadmill score was 6 implying low risk.      IMAGING: Myocardial perfusion imaging was performed at rest 30-35 minutes following the

## 2023-04-04 ENCOUNTER — TRANSCRIBE ORDERS (OUTPATIENT)
Dept: ADMINISTRATIVE | Age: 63
End: 2023-04-04

## 2023-04-04 DIAGNOSIS — R06.09 DYSPNEA ON EXERTION: Primary | ICD-10-CM

## 2023-05-01 ENCOUNTER — HOSPITAL ENCOUNTER (OUTPATIENT)
Dept: PULMONOLOGY | Age: 63
Discharge: HOME | End: 2023-05-01
Payer: COMMERCIAL

## 2023-05-01 DIAGNOSIS — R06.09 DYSPNEA ON EXERTION: ICD-10-CM

## 2023-05-01 PROCEDURE — 94729 DIFFUSING CAPACITY: CPT

## 2023-05-01 PROCEDURE — 94726 PLETHYSMOGRAPHY LUNG VOLUMES: CPT

## 2023-05-01 PROCEDURE — 94060 EVALUATION OF WHEEZING: CPT

## 2023-05-04 NOTE — PROCEDURES
[address]                  [address] [facility],  [facility]                               PULMONARY FUNCTION    PATIENT NAME: David Chen                   :        1960  MED REC NO:   [id number]                            ROOM:  ACCOUNT NO:   [de-identified]                           ADMIT DATE: 2023  PROVIDER:     Killian Koch DO    DATE OF PROCEDURE:  2023    IDENTIFYING DATA:  A 77-year-old male, 69 inches, 175 pounds with  shortness of breath. FINDINGS:  Pulmonary function test showed forced vital capacity  postbronchodilator 4.53 liters, 93% predicted, FEV1 of 3.30 liters, 98%  of predicted with an FEV1-FVC ratio of 73%. Mid flow rates are normal  with a maximum voluntary ventilation of 91 liters per minute, 68% of  predicted. Static lung volumes are normal with the slow vital capacity of 4.56  liters, 104% of predicted. Inspiratory capacity 3.83 liters, 97% of  predicted. Expiratory reserve volume of 1.66 liters, 113% of predicted. Thoracic gas volume of 4.07 liters, 114% of predicted. Residual volume  of 2.34 liters, 112% of predicted. Total lung capacity of 6.91 liters,  101% of predicted with an RV/TLC ratio of 101%. Diffusion capacity is  normal at 26.37 mL/min/mmHg, which is 100% of predicted. IMPRESSION:  Overall pulmonary assessment is normal.  No definitive  airflow obstruction, hyperinflation, or air trapping. There is no  restrictive process or loss in gas transfer. Clinical correlation  needed.         Doc Jany, DO    D: 2023 17:51:36       T: 2023 0:41:34     [id number]  Job#: [id number]     Doc#: [id number]    CC: